# Patient Record
Sex: FEMALE | Race: BLACK OR AFRICAN AMERICAN | NOT HISPANIC OR LATINO | Employment: PART TIME | URBAN - METROPOLITAN AREA
[De-identification: names, ages, dates, MRNs, and addresses within clinical notes are randomized per-mention and may not be internally consistent; named-entity substitution may affect disease eponyms.]

---

## 2018-01-09 NOTE — PROGRESS NOTES
Chief Complaint  Patient came in for Varicella #2 no reaction temp 98 5 [place]      Active Problems    1  Allergic rhinitis (477 9) (J30 9)   2  BMI (body mass index), pediatric, greater than or equal to 95% for age (V80 51) (Z70 52)   3  Corneal abrasion, right (918 1) (S05 01XA)   4  Encounter for birth control (V25 9) (Z30 9)   5  Encounter for hearing test (V72 19) (Z01 10)   6  Encounter for PPD test (V74 1) (Z11 1)   7  Immunization, varicella (V05 4) (Z23)   8  Irregular menstrual cycle (626 4) (N92 6)   9  Need for DTaP vaccination (V06 1) (Z23)   10  Need for immunization against influenza (V04 81) (Z23)   11  Need for prophylactic vaccination and inoculation against influenza (V04 81) (Z23)   12  Need for Tdap vaccination (V06 1) (Z23)   13  Tick bite (919 4,E906 4) (W57 XXXA)   14  Vision test (V72 0) (Z01 00)   15  Weight gain (783 1) (R63 5)    Current Meds   1  Sprintec 28 0 25-35 MG-MCG Oral Tablet; take 1 tablet every day; Therapy: 85WYO9199 to (Evaluate:64Niv9086)  Requested for: 44KDP5938; Last   Rx:[id number] Ordered    Allergies    1  No Known Drug Allergies    2  Seasonal    Assessment    1   Immunization, varicella (V05 4) (Z23)    Signatures   Electronically signed by : Ivis Sanchez MD; May 24 2016  9:52PM EST

## 2018-01-11 NOTE — PROGRESS NOTES
Chief Complaint  Ulises Reich is being seen in office today for PPD injection, will return in 2 days to have read  Active Problems    1  Allergic rhinitis (477 9) (J30 9)   2  BMI (body mass index), pediatric, greater than or equal to 95% for age (V80 51) (Z70 52)   3  Corneal abrasion, right (918 1) (S05 01XA)   4  Encounter for birth control (V25 9) (Z30 9)   5  Encounter for hearing test (V72 19) (Z01 10)   6  Encounter for PPD test (V74 1) (Z11 1)   7  Irregular menstrual cycle (626 4) (N92 6)   8  Need for DTaP vaccination (V06 1) (Z23)   9  Need for immunization against influenza (V04 81) (Z23)   10  Need for prophylactic vaccination and inoculation against influenza (V04 81) (Z23)   11  Need for Tdap vaccination (V06 1) (Z23)   12  Tick bite (919 4,E906 4) (W57 XXXA)   13  Vision test (V72 0) (Z01 00)   14  Weight gain (783 1) (R63 5)    Current Meds   1  Sprintec 28 0 25-35 MG-MCG Oral Tablet; take 1 tablet every day; Therapy: 11OBA9719 to (Evaluate:04Jan2017)  Requested for: 25VGK2193; Last   Rx:[id number] Ordered    Allergies    1  No Known Drug Allergies    2   Seasonal    Plan  Encounter for PPD test    · Tubersol 5 UNIT/0 1ML Intradermal Solution    Future Appointments    Date/Time Provider Specialty Site   04/06/2016 11:20 AM Marisol Gallagher, Nurse Schedule  [address]     Signatures   Electronically signed by : Florinda Clark, ; Apr 4 2016 12:10PM EST                       (Author)    Electronically signed by : Chilango Love MD; Apr 4 2016  4:44PM EST                       (Co-participant)

## 2018-01-18 NOTE — MISCELLANEOUS
Message  Return to work or school:   Cortez Lincoln is under my professional care   She was seen in my office on 10/17/2016   She is able to return to work on  10/19/2016    She is able to return to school on 10/19/2016          Signatures   Electronically signed by : Reshma Mccall; Oct 20 2016  2:41PM EST                       (Author)    Electronically signed by : Estefani Guzman; Oct 21 2016 12:26PM EST                       (Author)

## 2018-03-28 ENCOUNTER — OFFICE VISIT (OUTPATIENT)
Dept: URGENT CARE | Age: 18
End: 2018-03-28
Payer: COMMERCIAL

## 2018-03-28 VITALS
HEART RATE: 110 BPM | DIASTOLIC BLOOD PRESSURE: 60 MMHG | SYSTOLIC BLOOD PRESSURE: 120 MMHG | TEMPERATURE: 99.3 F | BODY MASS INDEX: 30.05 KG/M2 | RESPIRATION RATE: 18 BRPM | OXYGEN SATURATION: 97 % | WEIGHT: 187 LBS | HEIGHT: 66 IN

## 2018-03-28 DIAGNOSIS — J02.9 SORE THROAT: ICD-10-CM

## 2018-03-28 DIAGNOSIS — J06.9 UPPER RESPIRATORY TRACT INFECTION, UNSPECIFIED TYPE: Primary | ICD-10-CM

## 2018-03-28 LAB — S PYO AG THROAT QL: NEGATIVE

## 2018-03-28 PROCEDURE — 99283 EMERGENCY DEPT VISIT LOW MDM: CPT | Performed by: FAMILY MEDICINE

## 2018-03-28 PROCEDURE — G0382 LEV 3 HOSP TYPE B ED VISIT: HCPCS | Performed by: FAMILY MEDICINE

## 2018-03-28 PROCEDURE — 87430 STREP A AG IA: CPT | Performed by: FAMILY MEDICINE

## 2018-03-28 RX ORDER — AZITHROMYCIN 250 MG/1
TABLET, FILM COATED ORAL
Qty: 6 TABLET | Refills: 0 | Status: SHIPPED | OUTPATIENT
Start: 2018-03-28 | End: 2018-04-01

## 2018-03-28 RX ORDER — LORATADINE 10 MG/1
10 TABLET ORAL DAILY PRN
COMMUNITY

## 2018-03-28 NOTE — PATIENT INSTRUCTIONS
1  Drink plenty fluids  2   Take probiotics [i e  Yogurt, Acidophilus, Florastor (liquid)] daily  3   Over-the-counter cough and cold medications as needed for symptomatic care  Mucinex PE    4  Advance activities as tolerated  5    Follow-up with your primary care physician in 3-4 days  6   Go to emergency room if symptoms are worsening

## 2018-03-28 NOTE — LETTER
March 28, 2018     Patient: Cassie Pires   YOB: 2000   Date of Visit: 3/28/2018       To Whom It May Concern:    Please excuse  Cassie Pires  from work 03/29/2018 due to illness           Sincerely,        Mino Moy PA-C    CC: No Recipients

## 2018-03-28 NOTE — PROGRESS NOTES
[address]        NAME: Delio Esposito is a 21 y o  female  : 2000    MRN: [id number]  DATE: 2018  TIME: 6:15 PM    Assessment and Plan   Upper respiratory tract infection, unspecified type [J06 9]  1  Upper respiratory tract infection, unspecified type  azithromycin (ZITHROMAX) 250 mg tablet         Patient Instructions       Follow up with PCP in 3-5 days  Proceed to  ER if symptoms worsen  Chief Complaint     Chief Complaint   Patient presents with    Cold Like Symptoms     Since yesterday - occ  harsh cough with PISANO, nasal congestion and PND, b/l ear pain (L > R), chills, sore throat and body aches  Taking Robitussin   Cough    Shortness of Breath    Sore Throat         History of Present Illness       Patient is here for evaluation sore throat, cough, thick green yellow sputum  He has been having some headaches and pressure behind her eyes  She denies any fevers but does have chills and body aches off and on  Review of Systems   Review of Systems   Constitutional: Positive for chills  Negative for fever  HENT: Positive for congestion, ear pain, postnasal drip, sinus pressure and sore throat  Negative for trouble swallowing  Eyes: Negative  Respiratory: Positive for cough  Negative for shortness of breath and wheezing  Cardiovascular: Negative            Current Medications       Current Outpatient Prescriptions:     loratadine (CLARITIN) 10 mg tablet, Take 10 mg by mouth daily as needed for allergies, Disp: , Rfl:     azithromycin (ZITHROMAX) 250 mg tablet, Take 2 tablets day 1 then 1 tab days 2-5, Disp: 6 tablet, Rfl: 0    Current Allergies     Allergies as of 2018    (No Known Allergies)            The following portions of the patient's history were reviewed and updated as appropriate: allergies, current medications, past family history, past medical history, past social history, past surgical history and problem list      Past Medical History: Diagnosis Date    Allergic rhinitis        History reviewed  No pertinent surgical history  History reviewed  No pertinent family history  Medications have been verified  Objective   /60 (BP Location: Right arm, Patient Position: Sitting, Cuff Size: Standard)   Pulse (!) 110   Temp 99 3 °F (37 4 °C) (Oral)   Resp 18   Ht 5' 6" (1 676 m)   Wt 84 8 kg (187 lb)   LMP 03/03/2018   SpO2 97%   BMI 30 18 kg/m²        Physical Exam     Physical Exam   Constitutional: She is oriented to person, place, and time  She appears well-developed and well-nourished  HENT:   Head: Normocephalic and atraumatic  Right Ear: External ear normal    Left Ear: External ear normal    Bilateral tonsillar erythema with mild soft tissue swelling  No exudate  Bilateral nasal congestion and erythema with mucopurulent drainage  Eyes: Conjunctivae and EOM are normal  Pupils are equal, round, and reactive to light  Neck: Normal range of motion  Neck supple  Cardiovascular: Normal rate, regular rhythm and normal heart sounds  No murmur heard  Pulmonary/Chest: Effort normal and breath sounds normal  She has no wheezes  She has no rales  Lymphadenopathy:     She has cervical adenopathy  Neurological: She is alert and oriented to person, place, and time  Skin: Skin is warm and dry  Psychiatric: She has a normal mood and affect  Her behavior is normal    Nursing note and vitals reviewed

## 2022-12-04 NOTE — PROGRESS NOTES
Chief Complaint  Being seen in office today for tdap (adacel) booster  Active Problems    1  Allergic rhinitis (477 9) (J30 9)   2  BMI (body mass index), pediatric, greater than or equal to 95% for age (V80 51) (Z70 52)   3  Corneal abrasion, right (918 1) (S05 01XA)   4  Encounter for birth control (V25 9) (Z30 9)   5  Encounter for hearing test (V72 19) (Z01 10)   6  Irregular menstrual cycle (626 4) (N92 6)   7  Need for DTaP vaccination (V06 1) (Z23)   8  Need for immunization against influenza (V04 81) (Z23)   9  Need for prophylactic vaccination and inoculation against influenza (V04 81) (Z23)   10  Tick bite (919 4,E906 4) (T14 8,W57  XXXA)   11  Vision test (V72 0) (Z01 00)    Current Meds   1  Sprintec 28 0 25-35 MG-MCG Oral Tablet; TAKE 1 TABLET DAILY; Therapy: 21LYG8853 to (Evaluate:26Jan2016)  Requested for: 96SJR1190; Last   Rx:[id number] Ordered    Allergies    1  No Known Drug Allergies    2   Seasonal    Plan  Need for Tdap vaccination    · Adacel 5-2-15 5 LF-MCG/0 5 Intramuscular Suspension    Signatures   Electronically signed by : Heide Cuevas DO; Jan 9 2016  2:12PM EST                       (Co-participant) Yes - the patient is able to be screened